# Patient Record
Sex: MALE | Race: ASIAN | NOT HISPANIC OR LATINO | URBAN - METROPOLITAN AREA
[De-identification: names, ages, dates, MRNs, and addresses within clinical notes are randomized per-mention and may not be internally consistent; named-entity substitution may affect disease eponyms.]

---

## 2022-02-18 ENCOUNTER — EMERGENCY (EMERGENCY)
Facility: HOSPITAL | Age: 37
LOS: 0 days | Discharge: HOME | End: 2022-02-18
Attending: EMERGENCY MEDICINE
Payer: COMMERCIAL

## 2022-02-18 VITALS — HEIGHT: 68 IN | WEIGHT: 160.06 LBS

## 2022-02-18 VITALS
OXYGEN SATURATION: 96 % | DIASTOLIC BLOOD PRESSURE: 95 MMHG | HEART RATE: 71 BPM | SYSTOLIC BLOOD PRESSURE: 131 MMHG | TEMPERATURE: 98 F | RESPIRATION RATE: 15 BRPM

## 2022-02-18 DIAGNOSIS — Z76.0 ENCOUNTER FOR ISSUE OF REPEAT PRESCRIPTION: ICD-10-CM

## 2022-02-18 PROCEDURE — 99281 EMR DPT VST MAYX REQ PHY/QHP: CPT

## 2022-02-18 PROCEDURE — 99282 EMERGENCY DEPT VISIT SF MDM: CPT

## 2022-02-18 RX ORDER — ACETAMINOPHEN 500 MG
650 TABLET ORAL ONCE
Refills: 0 | Status: DISCONTINUED | OUTPATIENT
Start: 2022-02-18 | End: 2022-02-18

## 2022-02-18 RX ORDER — ONDANSETRON 8 MG/1
4 TABLET, FILM COATED ORAL ONCE
Refills: 0 | Status: DISCONTINUED | OUTPATIENT
Start: 2022-02-18 | End: 2022-02-18

## 2022-02-18 RX ORDER — DIVALPROEX SODIUM 500 MG/1
1 TABLET, DELAYED RELEASE ORAL
Qty: 14 | Refills: 0
Start: 2022-02-18 | End: 2022-02-24

## 2022-02-18 NOTE — ED STATDOCS - OBJECTIVE STATEMENT
38 y/o male presents to the ED for psych refill medication. Pt with new job, and insurance, so he needs a referral and has been unable to get his psych medication. Pt was taking Divalproex Sodium 2 tabs daily 500 mg.

## 2022-02-18 NOTE — ED STATDOCS - PATIENT PORTAL LINK FT
You can access the FollowMyHealth Patient Portal offered by [facility] by registering at the following website: [email or web address]. By joining [employer]’s FollowMyHealth portal, you will also be able to view your health information using other applications (apps) compatible with our system.

## 2022-02-18 NOTE — ED STATDOCS - NSFOLLOWUPINSTRUCTIONS_ED_ALL_ED_FT
Please [facility] for [facility].          Psychosis      Psychosis, also called thought disturbance, refers to a severe loss of contact with reality. People having a psychotic episode are not able to think clearly, and their emotions and responses do not match with what is actually happening. People having a psychotic episode may have false beliefs about what is happening or who they are (delusions). They may see, hear, taste, smell, or feel things that are not present (hallucinations). They may also be very upset (agitated), have chaotic behavior, or be very quiet and withdrawn.      What are the causes?    This condition may be caused by:  •Very serious mental health (psychiatric) conditions such as schizophrenia, bipolar disorder, or major depression.      •Use of drugs such as hallucinogens or alcohol.      •Medical conditions such as delirium or neurological disorders.        What are the signs or symptoms?    Symptoms of this condition include:•Delusions, such as:  •Feeling a lot of fear or suspicion (paranoia).      •Believing something that is odd, unrealistic, or false, such as believing that you are someone else.      •Hallucinations, such as:  •Hearing or seeing things, smelling odors, experiencing tastes, or feeling bodily sensations.      •Command hallucinations that direct you to do something that could be dangerous.        •Disorganized thinking, such as thoughts that jump from one idea to another in a way that does not make sense.      •Disorganized speech, such as saying things that do not make sense, echoing others, or using words based on their sound rather than their meaning.      •Inappropriate behavior, such as talking to yourself, showing a clear increase or decrease in activity, or intruding on unfamiliar people.        How is this diagnosed?     This condition is diagnosed based on an assessment by a health care provider.•The health care provider may ask questions about:  •Your thoughts, feelings, and behavior.      •Any medical conditions you have.      •Any use of alcohol or drugs.      •One or more of the following may also be done:  •A physical exam.      •Blood tests.      •Brain imaging, such as a CT scan or MRI.      •A brain wave study (electroencephalogram, or EEG).        The health care provider may refer you to a mental health professional for further tests.      How is this treated?     Treatment for this condition may depend on the cause of the psychosis. Treatment may include one or more of the following:  •Supportive care and monitoring in the emergency room or hospital. You may need to stay in the hospital if you are a danger to yourself or others.      •Taking antipsychotic medicines to reduce symptoms and to balance chemicals in the brain.      •Treating an underlying medical condition.      •Stopping or reducing drugs that are causing psychosis.    •Therapy and other supportive programs, such as:  •Ongoing treatment and care from a mental health professional.      •Individual or family therapy.      •Training to learn new skills to cope with the psychosis and prevent further episodes.          Follow these instructions at home:    •Take over-the-counter and prescription medicines only as told by your health care provider.      •Consult a health care provider before taking over-the-counter medicines, herbs, or supplements.      •Surround yourself with people who care about you and can help manage your condition.      •Keep stress under control. Stress may trigger psychosis and make symptoms worse.    •Maintain a healthy lifestyle. This includes:  •Eating a healthy diet.      •Getting enough sleep.      •Exercising regularly.      •Avoiding alcohol, nicotine, and recreational drugs.        •Keep all follow-up visits as told by your health care provider. This is important.        Contact a health care provider if:    •Medicines do not seem to be helping.    •You or others notice that you:  •Continue to see, smell, or feel things that are not there.      •Hear voices telling you to do things.      •Feel extremely fearful and suspicious that someone or something will harm you.      •Feel unable to leave your house.      •Have trouble taking care of yourself.      •You have side effects of medicines, such as:  •Changes in sleep patterns.      •Dizziness.      •Weight gain.      •Restlessness.      •Movement changes.      •Shaking that you cannot control (tremors).          Get help right away if:  •You have serious side effects of medicine, such as:  •Swelling of the face, lips, tongue, or throat.      •Fever, confusion, muscle spasms, or seizures.        •You have serious thoughts about harming yourself or hurting others.      If you ever feel like you may hurt yourself or others, or have thoughts about taking your own life, get help right away. You can go to your nearest emergency department or call:   • Your local emergency services (911 in the [place]       • A suicide crisis helpline, such as the National Suicide Prevention Lifeline at 1-705.911.2879. This is open 24 hours a day.         Summary    •Psychosis refers to a severe loss of contact with reality. People having a psychotic episode are not able to think clearly, and they may have delusions or hallucinations.      •Psychosis is a serious medical condition that should be treated by a medical professional as soon as possible. Being checked and treated right away can stop or reduce symptoms. This prevents more serious problems from developing.      •In some cases, treatment may include taking antipsychotic medicines to reduce symptoms and to balance chemicals in the brain.      •Support programs may help you learn new skills to cope with the psychosis and prevent further episodes.      This information is not intended to replace advice given to you by your health care provider. Make sure you discuss any questions you have with your health care provider.

## 2022-02-18 NOTE — ED STATDOCS - CLINICAL SUMMARY MEDICAL DECISION MAKING FREE TEXT BOX
No medical complaints, here for refill, will give 1 week refill, given pt has some supply left, will provide information of [facility]. No medical complaints, here for refill, will give 1 week refill, given pt has some supply left, will provide information for [facility].  No si/hi.

## 2022-08-13 ENCOUNTER — TRANSCRIPTION ENCOUNTER (OUTPATIENT)
Age: 37
End: 2022-08-13